# Patient Record
(demographics unavailable — no encounter records)

---

## 2024-11-01 NOTE — ASSESSMENT
[FreeTextEntry1] : 73F with low back pain and spasm TPI with good response, now with pain in a different area, discussed TIMOTHY would be helpful overall for generalized LBP she is having Discussed trying spine injection with Pain management, TIMOTHY can be performed under local.  She reports having reaction to anesthesia, will discuss with her daughter (ER Doc) Can c/w with PT and HEP Trial of acupuncture She has temporary relief with OMM treatment, she is doing med massage weekly FU 6 weeks

## 2024-11-01 NOTE — HISTORY OF PRESENT ILLNESS
[de-identified] : 11/01/2024: PT states TPI was helpful.  She continues to have pain in another area. Pain with movement.  LLE pain to her great toe, side of calf across to her foot. No loss b/b control.  Gabapentin for Fibromyalgia has not been helpful.   T 73 F with low back pain and spasm. Hx of L3-s1 lami/fusion. Had TPI a few months ago with relief. Also with intermittent LLE radic to ankle.  No bowel or bladder symptoms.  No issues with balance fine motor movement or dexterity on gabapentin DId 7 weeks of Pt without relief.    he patient is a 73 year old female who presents today complaining of left low back pain Date of Injury/Onset:  6-8 weeks Pain:    At Rest:   1-2/10 With Activity:   8-9/10 Mechanism of injury:  onset Quality of symptoms:  constant annoying pain Improves with:  gabapentin Worse with:  n/a Prior treatment:  O&C UC Prior Imaging:  MRI @  7/11/24 Additional Information: None

## 2024-11-01 NOTE — IMAGING
[de-identified] : Spine: Inspection/Palpation: No tenderness to palpation throughout Cervical/thoracic/lumbar spine.  No bony stepoffs, No lesions.  Gait: nonantalgic, able to perform bilateral toe and heel rise.    Neurologic:  Bilateral Lower Extremities 5/5 Iliopsoas/Quadriceps/Hamstrings/ Tibialis Anterior/ Gastrocnemius. Extensor Hallucis Longus/ Flexor Hallucis Longus except    Sensation intact to light touch L2-S1  Patellar/ Achilles reflex within normal limits.   X-ray Ap/Lateral of lumbar spine were viewed and interpreted.  s/p l3-s1 lumbar fusion hardware in good position.  adajacent segment diseae  MRI Lumbar spine reviewed and interpreted independently.  Agree with report as follows. 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

## 2024-11-01 NOTE — PROCEDURE
[Trigger point 1-2 muscle groups] : trigger point 1-2 muscle groups [Left] : of the left [Lumbar paraspinal muscle] : lumbar paraspinal muscle [___ cc    1%] : Lidocaine ~Vcc of 1%  [___ cc    40mg] : Triamcinolone (Kenalog) ~Vcc of 40 mg  [] : Patient tolerated procedure well [Call if redness, pain or fever occur] : call if redness, pain or fever occur [Risks, benefits, alternatives discussed / Verbal consent obtained] : the risks benefits, and alternatives have been discussed, and verbal consent was obtained

## 2025-01-15 NOTE — HISTORY OF PRESENT ILLNESS
[FreeTextEntry1] : Patient is present today for a subsequent Annual Wellness Visit. and shortness of breath [de-identified] : Patient is a 73yr old female who is present today for a subsequent Annual Wellness Visit.  Patient is doing well overall. Patient c/o significant loss of energy which she describes as body weakness accompanied by SOB, ongoing for about two weeks. Denies chest tightness. Recommended to visit CARD. Denies any swelling of legs and wheezing. Reports strength is normal, unable to  objects due to arthritis. Denies heaviness of legs or body. Confirms sleeping normal. Denies having body aches. Pt confirms previous EKG 's in the past showed irregular irregularities, stable on this visit. Reports PULM function test done in the past. Confirms colonoscopy UTD. Confirms slight depression due to personal life changes, reports therapist visit scheduled tomorrow. Discussed CT scan of her chest being normal. Confirms appetite is normal.   Declines flu vaccine on this visit.  Denies any CP. Denies any abdominal pain, urinary symptom, or change in bowel habits. Denies any fever, chills, or night sweats.

## 2025-01-15 NOTE — HEALTH RISK ASSESSMENT
[Good] : ~his/her~  mood as  good [No] : In the past 12 months have you used drugs other than those required for medical reasons? No [No falls in past year] : Patient reported no falls in the past year [Former] : Former [NO] : No [Patient reported mammogram was normal] : Patient reported mammogram was normal [Patient reported bone density results were normal] : Patient reported bone density results were normal [Retired] : retired [Fully functional (bathing, dressing, toileting, transferring, walking, feeding)] : Fully functional (bathing, dressing, toileting, transferring, walking, feeding) [Fully functional (using the telephone, shopping, preparing meals, housekeeping, doing laundry, using] : Fully functional and needs no help or supervision to perform IADLs (using the telephone, shopping, preparing meals, housekeeping, doing laundry, using transportation, managing medications and managing finances) [Reports normal functional visual acuity (ie: able to read med bottle)] : Reports normal functional visual acuity [Smoke Detector] : smoke detector [Carbon Monoxide Detector] : carbon monoxide detector [Safety elements used in home] : safety elements used in home [Seat Belt] :  uses seat belt [1] : 2) Feeling down, depressed, or hopeless for several days (1) [> 15 Years] : > 15 Years [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 Grider, 11/01/24), PULM Telephone (Dr. Orlando, 09/05/24), OBGYN Telephone (07/30/24) [Change in mental status noted] : No change in mental status noted [Language] : denies difficulty with language [Behavior] : denies difficulty with behavior [Learning/Retaining New Information] : denies difficulty learning/retaining new information [Handling Complex Tasks] : denies difficulty handling complex tasks [Reasoning] : denies difficulty with reasoning [Spatial Ability and Orientation] : denies difficulty with spatial ability and orientation [Reports changes in hearing] : Reports no changes in hearing [Reports changes in vision] : Reports no changes in vision [Reports changes in dental health] : Reports no changes in dental health [Sunscreen] : does not use sunscreen [Travel to Developing Areas] : does not  travel to developing areas [TB Exposure] : is not being exposed to tuberculosis [Caregiver Concerns] : does not have caregiver concerns [MammogramDate] : 07/24 [MammogramComments] : BIRADS 2- Benign: No mammographic or ultrasonographic evidence of malignancy. [PapSmearComments] : n/a [BoneDensityDate] : 07/24 [ColonoscopyComments] : n/a [AdvancecareDate] : 1/25

## 2025-01-15 NOTE — HISTORY OF PRESENT ILLNESS
[FreeTextEntry1] : Patient is present today for a subsequent Annual Wellness Visit. and shortness of breath [de-identified] : Patient is a 73yr old female who is present today for a subsequent Annual Wellness Visit.  Patient is doing well overall. Patient c/o significant loss of energy which she describes as body weakness accompanied by SOB, ongoing for about two weeks. Denies chest tightness. Recommended to visit CARD. Denies any swelling of legs and wheezing. Reports strength is normal, unable to  objects due to arthritis. Denies heaviness of legs or body. Confirms sleeping normal. Denies having body aches. Pt confirms previous EKG 's in the past showed irregular irregularities, stable on this visit. Reports PULM function test done in the past. Confirms colonoscopy UTD. Confirms slight depression due to personal life changes, reports therapist visit scheduled tomorrow. Discussed CT scan of her chest being normal. Confirms appetite is normal.   Declines flu vaccine on this visit.  Denies any CP. Denies any abdominal pain, urinary symptom, or change in bowel habits. Denies any fever, chills, or night sweats.

## 2025-01-15 NOTE — HEALTH RISK ASSESSMENT
[Good] : ~his/her~  mood as  good [No] : In the past 12 months have you used drugs other than those required for medical reasons? No [No falls in past year] : Patient reported no falls in the past year [Former] : Former [NO] : No [Patient reported mammogram was normal] : Patient reported mammogram was normal [Patient reported bone density results were normal] : Patient reported bone density results were normal [Retired] : retired [Fully functional (bathing, dressing, toileting, transferring, walking, feeding)] : Fully functional (bathing, dressing, toileting, transferring, walking, feeding) [Fully functional (using the telephone, shopping, preparing meals, housekeeping, doing laundry, using] : Fully functional and needs no help or supervision to perform IADLs (using the telephone, shopping, preparing meals, housekeeping, doing laundry, using transportation, managing medications and managing finances) [Reports normal functional visual acuity (ie: able to read med bottle)] : Reports normal functional visual acuity [Smoke Detector] : smoke detector [Carbon Monoxide Detector] : carbon monoxide detector [Safety elements used in home] : safety elements used in home [Seat Belt] :  uses seat belt [1] : 2) Feeling down, depressed, or hopeless for several days (1) [> 15 Years] : > 15 Years [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 Grider, 11/01/24), PULM Telephone (Dr. Orlando, 09/05/24), OBGYN Telephone (07/30/24) [Change in mental status noted] : No change in mental status noted [Language] : denies difficulty with language [Behavior] : denies difficulty with behavior [Learning/Retaining New Information] : denies difficulty learning/retaining new information [Handling Complex Tasks] : denies difficulty handling complex tasks [Reasoning] : denies difficulty with reasoning [Spatial Ability and Orientation] : denies difficulty with spatial ability and orientation [Reports changes in hearing] : Reports no changes in hearing [Reports changes in vision] : Reports no changes in vision [Reports changes in dental health] : Reports no changes in dental health [Sunscreen] : does not use sunscreen [Travel to Developing Areas] : does not  travel to developing areas [TB Exposure] : is not being exposed to tuberculosis [Caregiver Concerns] : does not have caregiver concerns [MammogramDate] : 07/24 [MammogramComments] : BIRADS 2- Benign: No mammographic or ultrasonographic evidence of malignancy. [PapSmearComments] : n/a [BoneDensityDate] : 07/24 [ColonoscopyComments] : n/a [AdvancecareDate] : 1/25

## 2025-01-15 NOTE — ASSESSMENT
[FreeTextEntry1] :  Annual Physical Exam: CAD, native coronary artery, Dyspnea on exertion - EKG stable - BP is elevated. Continue current management. - Check A1c, lipid panels, vitamin levels, urine analysis, TSH, Creatine Kinase-CK, Ferritin, Magnesium, Phosphorus,   -CT reviewed, advised cardiology consult - RTO annually or as needed.   Pt verbalized understanding and will reach should any questions/concerns occur.

## 2025-01-15 NOTE — ADDENDUM
[FreeTextEntry1] : I, Alen Castellanos, acted as a scribe on behalf of Dr. Jong Thao MD, on 01/14/2025.   All medical entries made by the scribe were at my, Dr. Jong Thao MD, direction and personally dictated by me on 01/14/2025. I have reviewed the chart and agree that the record accurately reflects my personal performance of the history, physical exam, assessment and plan. I have also personally directed, reviewed, and agreed with the chart.

## 2025-02-06 NOTE — HISTORY OF PRESENT ILLNESS
[FreeTextEntry1] : 73F with CAD/PCI, AS s/p AVR 2019, Aflutter HLD carotid stenosis presents for f/u PMD: none HTN clinic: Dr De Anda  Previously seen: Dr Borjas, Dr Rosado for Cardiology  Previously, pt seen 12/22 for an initial visit following an 2 separate episodes of hypertensive emergency, went to ER, sent home. pt feeling well at appt here. BP meds reviewed/adjusted. since that appt, pt has had an episode of hypotension, meds adjusted again. 1/23, pt made several medication adjustments on her own, stopping norvasc, stopping coreg. (still on losartan and lasix), agreeable to restarting toprol. 7/23, feelign well. bp has been labile. pt has self modified her bp regimen several times. on losartan 100 toprol 75 bid norvasc 2.5 lasix 20, norvasc increased to 10. pt changed to coreg (from toprol). 9/23, pt states bp in 140s, self titrating meds, w coreg once a day, not on norvasc, but taking losartan and lasix. pt seen by dr de anda, plan: lasix 20 lopressor 75 bid losartan 50 bid decrease motrin usage use overall 11/23, feeling well, remains hypertensive.  last seen 2/24 by dr macedo, feeling well.   pt now presents for follow up. today,  pt feeling well. bp 190s systolic pt states she checks it at home daily and it is always controlled, still endorsing white coat htn. states full med compliance.  pt comfortable appearing no complaints, no cp sob HA blurry vision. Denies palpitations, dizziness, diaphoresis, syncope, LE edema, orthopnea  now on: lasix 20 lopressor 50 am and 75 pm losartan 50 bid   Prev cardiac history: see above Exercise: treadmill 1 mile a day Diet: tries to eat healthy, low cholesterol.  Previous cardiac testing: cath 9/22: diffuse RCA dz, not amenable to PCI, +PCI to LAD lesion IFR+. CFX 50%, RCA 40% TTE 2019: mod AS mod AI mild LVH normal LV systolic function, normal RV function Recent labs:  Med hx: CAD, AS s/p AVR, Aflutter HLD carotid stenosis Sx hx: AVR, b/l shoulder sx, spine sx.  Family hx: M: CAD Social hx: lives in [facility], with daughter and grandaughter 7yo. 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 no drugs Meds: asa 81 plavix 75 tricor lasix 20 lopressor 50 am and 75 pm losartan 50 bid gabapentin  Allergies: statins